# Patient Record
Sex: MALE | Race: WHITE | Employment: OTHER | URBAN - NONMETROPOLITAN AREA
[De-identification: names, ages, dates, MRNs, and addresses within clinical notes are randomized per-mention and may not be internally consistent; named-entity substitution may affect disease eponyms.]

---

## 2017-03-01 ENCOUNTER — PROCEDURE VISIT (OUTPATIENT)
Dept: UROLOGY | Age: 82
End: 2017-03-01

## 2017-03-01 DIAGNOSIS — Z85.46 PERSONAL HISTORY OF PROSTATE CANCER: Primary | ICD-10-CM

## 2017-03-01 PROCEDURE — 99999 PR OFFICE/OUTPT VISIT,PROCEDURE ONLY: CPT | Performed by: UROLOGY

## 2017-03-01 PROCEDURE — 1036F TOBACCO NON-USER: CPT | Performed by: UROLOGY

## 2017-03-01 PROCEDURE — 96402 CHEMO HORMON ANTINEOPL SQ/IM: CPT | Performed by: UROLOGY

## 2017-06-07 ENCOUNTER — OFFICE VISIT (OUTPATIENT)
Dept: UROLOGY | Age: 82
End: 2017-06-07
Payer: MEDICARE

## 2017-06-07 VITALS — HEART RATE: 60 BPM | SYSTOLIC BLOOD PRESSURE: 116 MMHG | DIASTOLIC BLOOD PRESSURE: 84 MMHG | RESPIRATION RATE: 16 BRPM

## 2017-06-07 DIAGNOSIS — C61 PROSTATE CANCER (HCC): Primary | ICD-10-CM

## 2017-06-07 PROCEDURE — 99999 PR OFFICE/OUTPT VISIT,PROCEDURE ONLY: CPT | Performed by: UROLOGY

## 2017-06-07 PROCEDURE — 96402 CHEMO HORMON ANTINEOPL SQ/IM: CPT | Performed by: UROLOGY

## 2017-06-07 PROCEDURE — 1036F TOBACCO NON-USER: CPT | Performed by: UROLOGY

## 2017-09-19 ENCOUNTER — PROCEDURE VISIT (OUTPATIENT)
Dept: UROLOGY | Age: 82
End: 2017-09-19
Payer: MEDICARE

## 2017-09-19 VITALS — HEART RATE: 64 BPM | SYSTOLIC BLOOD PRESSURE: 124 MMHG | RESPIRATION RATE: 16 BRPM | DIASTOLIC BLOOD PRESSURE: 80 MMHG

## 2017-09-19 DIAGNOSIS — C61 PROSTATE CANCER (HCC): Primary | ICD-10-CM

## 2017-09-19 PROCEDURE — 96402 CHEMO HORMON ANTINEOPL SQ/IM: CPT | Performed by: UROLOGY

## 2017-12-20 ENCOUNTER — OFFICE VISIT (OUTPATIENT)
Dept: UROLOGY | Age: 82
End: 2017-12-20
Payer: MEDICARE

## 2017-12-20 ENCOUNTER — HOSPITAL ENCOUNTER (OUTPATIENT)
Dept: LAB | Age: 82
Setting detail: SPECIMEN
Discharge: HOME | End: 2017-12-20
Payer: MEDICARE

## 2017-12-20 VITALS — DIASTOLIC BLOOD PRESSURE: 80 MMHG | SYSTOLIC BLOOD PRESSURE: 130 MMHG | HEART RATE: 62 BPM

## 2017-12-20 DIAGNOSIS — C61 PROSTATE CANCER (HCC): Primary | ICD-10-CM

## 2017-12-20 DIAGNOSIS — C61 PROSTATE CANCER (HCC): ICD-10-CM

## 2017-12-20 LAB
PROSTATE SPECIFIC ANTIGEN: 3.43 UG/L
TESTOSTERONE TOTAL: 10 NG/DL (ref 220–1000)

## 2017-12-20 PROCEDURE — 84403 ASSAY OF TOTAL TESTOSTERONE: CPT

## 2017-12-20 PROCEDURE — 96402 CHEMO HORMON ANTINEOPL SQ/IM: CPT | Performed by: UROLOGY

## 2017-12-20 PROCEDURE — 36415 COLL VENOUS BLD VENIPUNCTURE: CPT

## 2017-12-20 PROCEDURE — 84153 ASSAY OF PSA TOTAL: CPT

## 2017-12-20 PROCEDURE — 99212 OFFICE O/P EST SF 10 MIN: CPT | Performed by: UROLOGY

## 2017-12-20 NOTE — PROGRESS NOTES
Following 's plan of care:    Eligard 22.5 mg was given SQ in the Right lower quad  Lot#:  [id number]  NDC#: 50491-551-14  Expiration Date:  5/2019    After injection was given there were no reactions at injection site and patient was feeling well. Patient was notified that possible side effects from injections include:  Redness, swelling and itching at the injection site. Possible sie effects of androgen deprivation therapy, including hot flashes, flushing of the skin, increased weight, decreased sex drive, and difficulties with ED. Patient was instructed to call the office with any further questions or concerns. Medication was supplied from [facility].
 celecoxib (CELEBREX) 100 MG capsule       oxybutynin (DITROPAN) 5 MG tablet Take 5 mg by mouth 3 times daily      famotidine (PEPCID) 20 MG tablet Take 20 mg by mouth daily      Loratadine 10 MG CAPS Take by mouth      bicalutamide (CASODEX) 50 MG chemo tablet Take 50 mg by mouth daily.  cyanocobalamin 1000 MCG/ML injection Inject 1,000 mcg into the muscle every 30 days.  docusate sodium (COLACE) 100 MG capsule Take 100 mg by mouth daily.  Multiple Vitamins-Minerals (MULTIVITAMIN PO) Take 1 tablet by mouth daily.  polyethylene glycol (GLYCOLAX) packet Take 17 g by mouth daily.  psyllium (METAMUCIL) 0.52 G capsule Take 0.52 g by mouth daily.  tamsulosin (FLOMAX) 0.4 MG capsule Take 0.4 mg by mouth daily.  Cholecalciferol (VITAMIN D3) 2000 UNITS CAPS Take 1 capsule by mouth daily.  acetaminophen (TYLENOL) 325 MG tablet Take 650 mg by mouth every 6 hours as needed.  furosemide (LASIX) 20 MG tablet Take 20 mg by mouth See Admin Instructions. One-half tablet by mouth daily      pravastatin (PRAVACHOL) 20 MG tablet Take 20 mg by mouth daily. Review of patient's allergies indicates no known allergies. History   Smoking Status    Never Smoker   Smokeless Tobacco    Never Used       History   Alcohol Use No       REVIEW OF SYSTEMS:  Constitutional: negative  Eyes: negative  Respiratory: negative  Cardiovascular: negative  Gastrointestinal: negative  Musculoskeletal: negative  Genitourinary: negative except for what is in HPI  Skin: negative   Neurological: negative  Hematological/Lymphatic: negative  Psychological: negative    Physical Exam:      Vitals:    12/20/17 1302   BP: 130/80   Pulse: 62     Patient is a 80 y.o. male in no acute distress and alert and oriented to person, place and time. NAD, A/o  Non labored respiration  Normal peripheral pulses  Skin- warm and dry  Psych- normal mood and affect      Assessment and Plan      1.  Prostate cancer ([facility] [address]

## 2018-03-28 ENCOUNTER — HOSPITAL ENCOUNTER (OUTPATIENT)
Dept: LAB | Age: 83
Setting detail: SPECIMEN
Discharge: HOME | End: 2018-03-28
Payer: MEDICARE

## 2018-03-28 DIAGNOSIS — C61 PROSTATE CANCER (HCC): ICD-10-CM

## 2018-03-28 LAB — PROSTATE SPECIFIC ANTIGEN: 3.92 UG/L

## 2018-03-28 PROCEDURE — 36415 COLL VENOUS BLD VENIPUNCTURE: CPT

## 2018-03-28 PROCEDURE — 84153 ASSAY OF PSA TOTAL: CPT

## 2018-03-30 LAB — DIAGNOSTIC PSA: 3.14 NG/ML

## 2018-06-27 ENCOUNTER — OFFICE VISIT (OUTPATIENT)
Dept: UROLOGY | Age: 83
End: 2018-06-27
Payer: MEDICARE

## 2018-06-27 VITALS — DIASTOLIC BLOOD PRESSURE: 88 MMHG | HEART RATE: 80 BPM | SYSTOLIC BLOOD PRESSURE: 138 MMHG

## 2018-06-27 DIAGNOSIS — R39.9 LOWER URINARY TRACT SYMPTOMS (LUTS): ICD-10-CM

## 2018-06-27 DIAGNOSIS — C61 PROSTATE CANCER (HCC): Primary | ICD-10-CM

## 2018-06-27 DIAGNOSIS — N28.1 RENAL CYST: ICD-10-CM

## 2018-06-27 PROCEDURE — 96402 CHEMO HORMON ANTINEOPL SQ/IM: CPT | Performed by: UROLOGY

## 2018-06-27 PROCEDURE — 1123F ACP DISCUSS/DSCN MKR DOCD: CPT | Performed by: UROLOGY

## 2018-06-27 PROCEDURE — 99214 OFFICE O/P EST MOD 30 MIN: CPT | Performed by: UROLOGY

## 2018-06-27 PROCEDURE — G8421 BMI NOT CALCULATED: HCPCS | Performed by: UROLOGY

## 2018-06-27 PROCEDURE — 4040F PNEUMOC VAC/ADMIN/RCVD: CPT | Performed by: UROLOGY

## 2018-06-27 PROCEDURE — G8427 DOCREV CUR MEDS BY ELIG CLIN: HCPCS | Performed by: UROLOGY

## 2018-06-27 PROCEDURE — 1036F TOBACCO NON-USER: CPT | Performed by: UROLOGY

## 2018-06-27 RX ORDER — BICALUTAMIDE 50 MG/1
50 TABLET, FILM COATED ORAL DAILY
Qty: 90 TABLET | Refills: 3 | Status: SHIPPED | OUTPATIENT
Start: 2018-06-27 | End: 2019-01-01

## 2018-08-01 ENCOUNTER — OFFICE VISIT (OUTPATIENT)
Dept: UROLOGY | Age: 83
End: 2018-08-01
Payer: MEDICARE

## 2018-08-01 VITALS — DIASTOLIC BLOOD PRESSURE: 80 MMHG | HEART RATE: 64 BPM | SYSTOLIC BLOOD PRESSURE: 134 MMHG | OXYGEN SATURATION: 96 %

## 2018-08-01 DIAGNOSIS — R39.9 LOWER URINARY TRACT SYMPTOMS (LUTS): ICD-10-CM

## 2018-08-01 DIAGNOSIS — N28.1 RENAL CYST: Primary | ICD-10-CM

## 2018-08-01 DIAGNOSIS — C61 PROSTATE CANCER (HCC): ICD-10-CM

## 2018-08-01 PROCEDURE — G8421 BMI NOT CALCULATED: HCPCS | Performed by: UROLOGY

## 2018-08-01 PROCEDURE — 1036F TOBACCO NON-USER: CPT | Performed by: UROLOGY

## 2018-08-01 PROCEDURE — 4040F PNEUMOC VAC/ADMIN/RCVD: CPT | Performed by: UROLOGY

## 2018-08-01 PROCEDURE — 1101F PT FALLS ASSESS-DOCD LE1/YR: CPT | Performed by: UROLOGY

## 2018-08-01 PROCEDURE — G8427 DOCREV CUR MEDS BY ELIG CLIN: HCPCS | Performed by: UROLOGY

## 2018-08-01 PROCEDURE — 1123F ACP DISCUSS/DSCN MKR DOCD: CPT | Performed by: UROLOGY

## 2018-08-01 PROCEDURE — 99214 OFFICE O/P EST MOD 30 MIN: CPT | Performed by: UROLOGY

## 2018-08-01 NOTE — PROGRESS NOTES
Augusto Bentley MD   Urology Clinic Progress Note      Patient:  Flakita Sinha  YOB: 1933  Date: 8/1/2018    HISTORY OF PRESENT ILLNESS:   The patient is a 80 y.o. male who presents today for follow-up for the following problem(s): prostate cancer  Overall the problem(s) : show no change. Associated Symptoms: No dysuria, gross hematuria. Pain Severity: Pain Score:   0 - No pain    Summary of old records: Prostate Cancer:  Patient is here today for prostate cancer which was first diagnosed many year(s) ago. His last several PSA values are as follows:  1.70 on 11/2/16  Last 1.6  Previous treatment for prostate cancer: XRT  Lower urinary tract symptoms: none as patient had a penectomy. PSA 6/4/2018 is 6.00  CT scan and bone scan 4/2018 does not show evidence of metastatic dz   Testosterone is 10    Additional History:     Minimally verbal   Asymptomatic  Eligard shot q 6 months        Last several PSA's:  Lab Results   Component Value Date    PSA 3.92 03/28/2018    PSA 3.43 12/20/2017       Last total testosterone:  Lab Results   Component Value Date    TESTOSTERONE 10 (L) 12/20/2017       Urinalysis today:  No results found for this visit on 08/01/18.     Last BUN and creatinine:  No results found for: BUN  No results found for: CREATININE    Imaging Reviewed during this Office Visit:   (results were independently reviewed by physician and radiology report verified)    PAST MEDICAL, FAMILY AND SOCIAL HISTORY UPDATE:  Past Medical History:   Diagnosis Date    Anemia     BPH (benign prostatic hyperplasia)     Dementia     History of prostate cancer     Hyperlipidemia     Mental retardation     Osteoarthritis      Past Surgical History:   Procedure Laterality Date    OTHER SURGICAL HISTORY  03/05/2013    WIDE DEBRIDEMENT OF NECROTIC TISSUE, DISTAL PENECTOMY, HEMISCROTECTOMY, PARTIAL URETERECTOMY    URETER REMOVAL  03/05/2013    PARTIAL    URETHRAL STRICTURE DILATATION       History reviewed. No pertinent family history. Outpatient Prescriptions Marked as Taking for the 8/1/18 encounter (Office Visit) with Shana Ricks MD   Medication Sig Dispense Refill    Nutritional Supplements (ENSURE PLUS HN PO) Take by mouth 2 times daily      celecoxib (CELEBREX) 100 MG capsule       oxybutynin (DITROPAN) 5 MG tablet Take 5 mg by mouth 3 times daily      famotidine (PEPCID) 20 MG tablet Take 20 mg by mouth daily      Loratadine 10 MG CAPS Take by mouth      cyanocobalamin 1000 MCG/ML injection Inject 1,000 mcg into the muscle every 30 days.  docusate sodium (COLACE) 100 MG capsule Take 100 mg by mouth daily.  Multiple Vitamins-Minerals (MULTIVITAMIN PO) Take 1 tablet by mouth daily.  polyethylene glycol (GLYCOLAX) packet Take 17 g by mouth daily.  psyllium (METAMUCIL) 0.52 G capsule Take 0.52 g by mouth daily.  tamsulosin (FLOMAX) 0.4 MG capsule Take 0.4 mg by mouth daily.  Cholecalciferol (VITAMIN D3) 2000 UNITS CAPS Take 1 capsule by mouth daily.  acetaminophen (TYLENOL) 325 MG tablet Take 650 mg by mouth every 6 hours as needed.  furosemide (LASIX) 20 MG tablet Take 20 mg by mouth See Admin Instructions. One-half tablet by mouth daily      pravastatin (PRAVACHOL) 20 MG tablet Take 20 mg by mouth daily. Patient has no known allergies.   History   Smoking Status    Never Smoker   Smokeless Tobacco    Never Used       History   Alcohol Use No       REVIEW OF SYSTEMS:  Constitutional: negative  Eyes: negative  Respiratory: negative  Cardiovascular: negative  Gastrointestinal: negative  Musculoskeletal: negative  Genitourinary: negative except for what is in HPI  Skin: negative   Neurological: negative  Hematological/Lymphatic: negative  Psychological: negative    Physical Exam:      Vitals:    08/01/18 1404   BP: 134/80   Pulse: 64   SpO2: 96%     Patient is a 80 y.o. male in no acute distress and alert and oriented to person, place and

## 2018-09-20 LAB — DIAGNOSTIC PSA: 7.17 NG/ML

## 2018-10-03 ENCOUNTER — PROCEDURE VISIT (OUTPATIENT)
Dept: UROLOGY | Age: 83
End: 2018-10-03
Payer: MEDICARE

## 2018-10-03 ENCOUNTER — HOSPITAL ENCOUNTER (OUTPATIENT)
Dept: LAB | Age: 83
Setting detail: SPECIMEN
Discharge: HOME | End: 2018-10-03
Payer: MEDICARE

## 2018-10-03 VITALS — DIASTOLIC BLOOD PRESSURE: 86 MMHG | SYSTOLIC BLOOD PRESSURE: 130 MMHG | HEART RATE: 96 BPM

## 2018-10-03 DIAGNOSIS — C61 PROSTATE CANCER (HCC): Primary | ICD-10-CM

## 2018-10-03 DIAGNOSIS — C61 PROSTATE CANCER (HCC): ICD-10-CM

## 2018-10-03 LAB
CREAT SERPL-MCNC: 0.75 MG/DL (ref 0.7–1.2)
GFR AFRICAN AMERICAN: >60 ML/MIN
GFR NON-AFRICAN AMERICAN: >60 ML/MIN
GFR SERPL CREATININE-BSD FRML MDRD: NORMAL ML/MIN/{1.73_M2}
GFR SERPL CREATININE-BSD FRML MDRD: NORMAL ML/MIN/{1.73_M2}

## 2018-10-03 PROCEDURE — 1036F TOBACCO NON-USER: CPT | Performed by: UROLOGY

## 2018-10-03 PROCEDURE — 4040F PNEUMOC VAC/ADMIN/RCVD: CPT | Performed by: UROLOGY

## 2018-10-03 PROCEDURE — G8428 CUR MEDS NOT DOCUMENT: HCPCS | Performed by: UROLOGY

## 2018-10-03 PROCEDURE — 1101F PT FALLS ASSESS-DOCD LE1/YR: CPT | Performed by: UROLOGY

## 2018-10-03 PROCEDURE — 96402 CHEMO HORMON ANTINEOPL SQ/IM: CPT | Performed by: UROLOGY

## 2018-10-03 PROCEDURE — 36415 COLL VENOUS BLD VENIPUNCTURE: CPT

## 2018-10-03 PROCEDURE — G8484 FLU IMMUNIZE NO ADMIN: HCPCS | Performed by: UROLOGY

## 2018-10-03 PROCEDURE — 1123F ACP DISCUSS/DSCN MKR DOCD: CPT | Performed by: UROLOGY

## 2018-10-03 PROCEDURE — 82565 ASSAY OF CREATININE: CPT

## 2018-10-03 PROCEDURE — 99213 OFFICE O/P EST LOW 20 MIN: CPT | Performed by: UROLOGY

## 2018-10-03 PROCEDURE — G8421 BMI NOT CALCULATED: HCPCS | Performed by: UROLOGY

## 2018-10-15 ENCOUNTER — HOSPITAL ENCOUNTER (OUTPATIENT)
Dept: CT IMAGING | Age: 83
Discharge: HOME | End: 2018-10-17
Payer: MEDICARE

## 2018-10-15 ENCOUNTER — HOSPITAL ENCOUNTER (OUTPATIENT)
Dept: NUCLEAR MEDICINE | Age: 83
End: 2018-10-15
Payer: MEDICARE

## 2018-10-15 ENCOUNTER — HOSPITAL ENCOUNTER (OUTPATIENT)
Dept: NUCLEAR MEDICINE | Age: 83
Discharge: HOME | End: 2018-10-17
Payer: MEDICARE

## 2018-10-15 DIAGNOSIS — C61 PROSTATE CANCER (HCC): ICD-10-CM

## 2018-10-15 PROCEDURE — 3430000000 HC RX DIAGNOSTIC RADIOPHARMACEUTICAL: Performed by: UROLOGY

## 2018-10-15 PROCEDURE — 78306 BONE IMAGING WHOLE BODY: CPT

## 2018-10-15 PROCEDURE — A9503 TC99M MEDRONATE: HCPCS | Performed by: UROLOGY

## 2018-10-15 PROCEDURE — 6360000004 HC RX CONTRAST MEDICATION: Performed by: UROLOGY

## 2018-10-15 PROCEDURE — 2709999900 CT ABDOMEN PELVIS W IV CONTRAST

## 2018-10-15 RX ORDER — TC 99M MEDRONATE 20 MG/10ML
25 INJECTION, POWDER, LYOPHILIZED, FOR SOLUTION INTRAVENOUS
Status: COMPLETED | OUTPATIENT
Start: 2018-10-15 | End: 2018-10-15

## 2018-10-15 RX ADMIN — Medication 25 MILLICURIE: at 12:30

## 2018-10-15 RX ADMIN — IOPAMIDOL 100 ML: 755 INJECTION, SOLUTION INTRAVENOUS at 14:08

## 2018-10-24 ENCOUNTER — OFFICE VISIT (OUTPATIENT)
Dept: UROLOGY | Age: 83
End: 2018-10-24
Payer: MEDICARE

## 2018-10-24 VITALS — DIASTOLIC BLOOD PRESSURE: 82 MMHG | HEART RATE: 56 BPM | OXYGEN SATURATION: 97 % | SYSTOLIC BLOOD PRESSURE: 122 MMHG

## 2018-10-24 DIAGNOSIS — C61 PROSTATE CANCER (HCC): Primary | ICD-10-CM

## 2018-10-24 DIAGNOSIS — R39.9 LOWER URINARY TRACT SYMPTOMS (LUTS): ICD-10-CM

## 2018-10-24 DIAGNOSIS — N28.1 RENAL CYST: ICD-10-CM

## 2018-10-24 PROCEDURE — 1101F PT FALLS ASSESS-DOCD LE1/YR: CPT | Performed by: UROLOGY

## 2018-10-24 PROCEDURE — G8427 DOCREV CUR MEDS BY ELIG CLIN: HCPCS | Performed by: UROLOGY

## 2018-10-24 PROCEDURE — 4040F PNEUMOC VAC/ADMIN/RCVD: CPT | Performed by: UROLOGY

## 2018-10-24 PROCEDURE — 1123F ACP DISCUSS/DSCN MKR DOCD: CPT | Performed by: UROLOGY

## 2018-10-24 PROCEDURE — 99214 OFFICE O/P EST MOD 30 MIN: CPT | Performed by: UROLOGY

## 2018-10-24 PROCEDURE — G8421 BMI NOT CALCULATED: HCPCS | Performed by: UROLOGY

## 2018-10-24 PROCEDURE — 1036F TOBACCO NON-USER: CPT | Performed by: UROLOGY

## 2018-10-24 PROCEDURE — G8484 FLU IMMUNIZE NO ADMIN: HCPCS | Performed by: UROLOGY

## 2018-10-24 NOTE — PROGRESS NOTES
There cystic changes involving the right kidney, largest measuring 2.6 cm in greatest axial dimension. Smaller cyst is noted involving the left kidney measuring 1.5 cm. No hydronephrosis. The abdominal aorta demonstrates mild calcification without aneurysm. GI/Bowel: Small hiatal hernia. Distal stomach appears grossly unremarkable. Small bowel appears nondilated. The appendix is not clearly identified. No acute colonic abnormality is seen. Pelvis: Urinary bladder is decompressed by a suprapubic catheter. Prostate gland appears normal in size. Peritoneum/Retroperitoneum: No free air, free fluid or lymphadenopathy. Bones/Soft Tissues: Abdominal wall demonstrates uncomplicated fat filled umbilical hernia. No abdominal wall fluid collection is noted. Bilateral gynecomastia is seen. Osseous structures demonstrate degenerative change. No aggressive bony lesions are identified, however this will be completely evaluated by bone scan. 1. No definitive CT evidence of progression of disease. 2. Small hiatal hernia. Nm Bone Scan Whole Body    Result Date: 10/16/2018  EXAMINATION: WHOLE BODY BONE SCAN  10/15/2018 TECHNIQUE: The patient was injected intravenously with 25.9 mCi of 99 mTc MDP and scintigraphy of the entire skeleton was performed approximately three hours later. COMPARISON: CT abdomen and pelvis performed earlier today. HISTORY: ORDERING SYSTEM PROVIDED HISTORY: Prostate cancer [facility]) TECHNOLOGIST PROVIDED HISTORY: prostate cancer FINDINGS: Examination is suboptimal due to patient condition as the patient's upper extremities are seen overlying the chest and abdomen. No abnormal foci of radiotracer uptake to suggest metastatic disease. Foci of activity in the shoulders and knees are most likely degenerative. In addition, there there is regions are seen extending from the patient's suprapubic catheter is well as the spilling into the patient's groin region possibly from his penis.  The remainder of the osseous structures and soft tissues are unremarkable. Physiologic activity is present in the skeletal and renal collecting systems. Suboptimal evaluation due to patient condition. Given that limitation, no scintigraphic evidence to suggest osseous metastatic disease. Minimally verbal   Asymptomatic  Eligard shot q 6 months    Last several PSA's:  Lab Results   Component Value Date    PSA 3.92 03/28/2018    PSA 3.43 12/20/2017       Last total testosterone:  Lab Results   Component Value Date    TESTOSTERONE 10 (L) 12/20/2017       Urinalysis today:  No results found for this visit on 10/24/18. Last BUN and creatinine:  No results found for: BUN  Lab Results   Component Value Date    CREATININE 0.75 10/03/2018       Imaging Reviewed during this Office Visit:   (results were independently reviewed by physician and radiology report verified)    PAST MEDICAL, FAMILY AND SOCIAL HISTORY UPDATE:  Past Medical History:   Diagnosis Date    Anemia     BPH (benign prostatic hyperplasia)     Dementia     History of prostate cancer     Hyperlipidemia     Mental retardation     Osteoarthritis      Past Surgical History:   Procedure Laterality Date    OTHER SURGICAL HISTORY  03/05/2013    WIDE DEBRIDEMENT OF NECROTIC TISSUE, DISTAL PENECTOMY, HEMISCROTECTOMY, PARTIAL URETERECTOMY    URETER REMOVAL  03/05/2013    PARTIAL    URETHRAL STRICTURE DILATATION       History reviewed. No pertinent family history. No outpatient prescriptions have been marked as taking for the 10/24/18 encounter (Office Visit) with Shanthi Galvin MD.       Patient has no known allergies.   History   Smoking Status    Never Smoker   Smokeless Tobacco    Never Used       History   Alcohol Use No       REVIEW OF SYSTEMS:  Constitutional: negative  Eyes: negative  Respiratory: negative  Cardiovascular: negative  Gastrointestinal: negative  Musculoskeletal: negative  Genitourinary: negative except for what is in HPI  Skin: negative

## 2019-01-01 ENCOUNTER — OFFICE VISIT (OUTPATIENT)
Dept: UROLOGY | Age: 84
End: 2019-01-01
Payer: MEDICARE

## 2019-01-01 ENCOUNTER — PROCEDURE VISIT (OUTPATIENT)
Dept: UROLOGY | Age: 84
End: 2019-01-01
Payer: MEDICARE

## 2019-01-01 ENCOUNTER — HOSPITAL ENCOUNTER (OUTPATIENT)
Dept: NUCLEAR MEDICINE | Age: 84
Discharge: HOME | End: 2019-06-07
Payer: MEDICARE

## 2019-01-01 ENCOUNTER — HOSPITAL ENCOUNTER (OUTPATIENT)
Dept: NUCLEAR MEDICINE | Age: 84
Discharge: HOME | End: 2019-12-08
Payer: MEDICARE

## 2019-01-01 ENCOUNTER — HOSPITAL ENCOUNTER (OUTPATIENT)
Dept: CT IMAGING | Age: 84
Discharge: HOME | End: 2019-06-07
Payer: MEDICARE

## 2019-01-01 ENCOUNTER — HOSPITAL ENCOUNTER (OUTPATIENT)
Dept: CT IMAGING | Age: 84
Discharge: HOME | End: 2019-12-08
Payer: MEDICARE

## 2019-01-01 ENCOUNTER — OFFICE VISIT (OUTPATIENT)
Dept: ONCOLOGY | Age: 84
End: 2019-01-01
Payer: MEDICARE

## 2019-01-01 VITALS
SYSTOLIC BLOOD PRESSURE: 136 MMHG | TEMPERATURE: 97.5 F | RESPIRATION RATE: 16 BRPM | DIASTOLIC BLOOD PRESSURE: 80 MMHG | HEART RATE: 64 BPM

## 2019-01-01 VITALS — SYSTOLIC BLOOD PRESSURE: 120 MMHG | DIASTOLIC BLOOD PRESSURE: 70 MMHG | HEART RATE: 80 BPM

## 2019-01-01 VITALS
DIASTOLIC BLOOD PRESSURE: 74 MMHG | RESPIRATION RATE: 16 BRPM | HEART RATE: 60 BPM | SYSTOLIC BLOOD PRESSURE: 124 MMHG | OXYGEN SATURATION: 95 %

## 2019-01-01 VITALS — OXYGEN SATURATION: 97 % | DIASTOLIC BLOOD PRESSURE: 82 MMHG | SYSTOLIC BLOOD PRESSURE: 124 MMHG | HEART RATE: 58 BPM

## 2019-01-01 DIAGNOSIS — R54 FRAIL ELDERLY: ICD-10-CM

## 2019-01-01 DIAGNOSIS — C61 PROSTATE CANCER (HCC): Primary | ICD-10-CM

## 2019-01-01 DIAGNOSIS — C61 PROSTATE CANCER (HCC): ICD-10-CM

## 2019-01-01 DIAGNOSIS — N28.1 RENAL CYST: ICD-10-CM

## 2019-01-01 DIAGNOSIS — R39.9 LOWER URINARY TRACT SYMPTOMS (LUTS): ICD-10-CM

## 2019-01-01 DIAGNOSIS — R97.20 RISING PSA LEVEL: ICD-10-CM

## 2019-01-01 DIAGNOSIS — F03.90 DEMENTIA WITHOUT BEHAVIORAL DISTURBANCE, UNSPECIFIED DEMENTIA TYPE: ICD-10-CM

## 2019-01-01 LAB
AGE FOR GFR: 85
CREAT SERPL-MCNC: 0.6 MG/DL (ref 0.7–1.3)
DIAGNOSTIC PSA: 22.6 NG/ML (ref 0–4)
DIAGNOSTIC PSA: 23.4 NG/ML (ref 0–4)
EGFR BF: 115 ML/MIN/1.73 M2
EGFR BM: 155 ML/MIN/1.73 M2
EGFR WF: 95 ML/MIN/1.73 M2
EGFR WM: 128 ML/MIN/1.73 M2
PROSTATE SPECIFIC ANTIGEN: 46.3 NG/ML

## 2019-01-01 PROCEDURE — 99213 OFFICE O/P EST LOW 20 MIN: CPT | Performed by: UROLOGY

## 2019-01-01 PROCEDURE — 1036F TOBACCO NON-USER: CPT | Performed by: UROLOGY

## 2019-01-01 PROCEDURE — 6360000004 HC RX CONTRAST MEDICATION: Performed by: UROLOGY

## 2019-01-01 PROCEDURE — 96402 CHEMO HORMON ANTINEOPL SQ/IM: CPT | Performed by: UROLOGY

## 2019-01-01 PROCEDURE — A9503 TC99M MEDRONATE: HCPCS | Performed by: UROLOGY

## 2019-01-01 PROCEDURE — 3430000000 HC RX DIAGNOSTIC RADIOPHARMACEUTICAL: Performed by: UROLOGY

## 2019-01-01 PROCEDURE — 1123F ACP DISCUSS/DSCN MKR DOCD: CPT | Performed by: UROLOGY

## 2019-01-01 PROCEDURE — G8427 DOCREV CUR MEDS BY ELIG CLIN: HCPCS | Performed by: UROLOGY

## 2019-01-01 PROCEDURE — G8427 DOCREV CUR MEDS BY ELIG CLIN: HCPCS | Performed by: INTERNAL MEDICINE

## 2019-01-01 PROCEDURE — G8421 BMI NOT CALCULATED: HCPCS | Performed by: INTERNAL MEDICINE

## 2019-01-01 PROCEDURE — 78306 BONE IMAGING WHOLE BODY: CPT

## 2019-01-01 PROCEDURE — 4040F PNEUMOC VAC/ADMIN/RCVD: CPT | Performed by: UROLOGY

## 2019-01-01 PROCEDURE — 99214 OFFICE O/P EST MOD 30 MIN: CPT | Performed by: UROLOGY

## 2019-01-01 PROCEDURE — G8484 FLU IMMUNIZE NO ADMIN: HCPCS | Performed by: INTERNAL MEDICINE

## 2019-01-01 PROCEDURE — G8421 BMI NOT CALCULATED: HCPCS | Performed by: UROLOGY

## 2019-01-01 PROCEDURE — 74176 CT ABD & PELVIS W/O CONTRAST: CPT

## 2019-01-01 PROCEDURE — 99204 OFFICE O/P NEW MOD 45 MIN: CPT | Performed by: INTERNAL MEDICINE

## 2019-01-01 PROCEDURE — 74177 CT ABD & PELVIS W/CONTRAST: CPT

## 2019-01-01 RX ORDER — IPRATROPIUM BROMIDE AND ALBUTEROL SULFATE 2.5; .5 MG/3ML; MG/3ML
1 SOLUTION RESPIRATORY (INHALATION) EVERY 4 HOURS
COMMUNITY

## 2019-01-01 RX ORDER — DICLOFENAC POTASSIUM 50 MG/1
50 TABLET, FILM COATED ORAL 3 TIMES DAILY
COMMUNITY

## 2019-01-01 RX ORDER — FERROUS SULFATE 325(65) MG
325 TABLET ORAL
COMMUNITY

## 2019-01-01 RX ORDER — TC 99M MEDRONATE 20 MG/10ML
25 INJECTION, POWDER, LYOPHILIZED, FOR SOLUTION INTRAVENOUS
Status: COMPLETED | OUTPATIENT
Start: 2019-01-01 | End: 2019-01-01

## 2019-01-01 RX ORDER — BICALUTAMIDE 50 MG/1
50 TABLET, FILM COATED ORAL DAILY
Qty: 30 TABLET | Refills: 3 | Status: SHIPPED | OUTPATIENT
Start: 2019-01-01

## 2019-01-01 RX ORDER — ESCITALOPRAM OXALATE 5 MG/1
5 TABLET ORAL DAILY
COMMUNITY

## 2019-01-01 RX ORDER — ACETYLCYSTEINE 200 MG/ML
70 SOLUTION ORAL; RESPIRATORY (INHALATION) EVERY 4 HOURS
COMMUNITY

## 2019-01-01 RX ADMIN — Medication 25 MILLICURIE: at 12:33

## 2019-01-01 RX ADMIN — IOPAMIDOL 100 ML: 755 INJECTION, SOLUTION INTRAVENOUS at 12:37

## 2019-01-01 RX ADMIN — Medication 25 MILLICURIE: at 09:19

## 2019-05-15 NOTE — PROGRESS NOTES
Eligaurd 45mg given SC in LLQ, as ordered, patient tolerated well. NDC#: 88509-797-89  Lot: [id number]  Exp date: 2/28/20    After injection was given there were no reactions at injection site and patient was feeling well . Patient notified that possible side effects from injections include: redness, swelling, and itching at the injection site. Possible side effects of androgen deprivation therapy including hot flashes, flushing of skin, increased weight, decreased sex drive, difficulties with ED. Patient was instructed to call office with any further questions or concerns. Medication was supplied from [facility].

## 2019-05-15 NOTE — PROGRESS NOTES
Echo Price MD   Urology Clinic Progress Note      Patient:  Gentry Montoya  YOB: 1933  Date: 5/15/2019    HISTORY OF PRESENT ILLNESS:   The patient is a 80 y.o. male who presents today for follow-up for the following problem(s): prostate cancer  Overall the problem(s) : worsening  Associated Symptoms: No dysuria, gross hematuria. Pain Severity:      Summary of old records: Prostate Cancer:  Patient is here today for prostate cancer which was first diagnosed many year(s) ago. His last several PSA values are as follows:  1.70 on 11/2/16  Last 1.6  Previous treatment for prostate cancer: XRT  Lower urinary tract symptoms: none as patient had a penectomy. PSA 6/4/2018 is 6.00  CT scan and bone scan 4/2018 does not show evidence of metastatic dz   Testosterone is 10    Additional History:     PSA from 7 to 23.4  Minimally verbal   Asymptomatic  Eligard shot q 6 months    I independently reviewed and verified the images and reports from:  Ct Abdomen Pelvis W Iv Contrast Additional Contrast? None  Result Date: 10/15/2018  1. No definitive CT evidence of progression of disease. 2. Small hiatal hernia. Nm Bone Scan Whole Body  Result Date: 10/16/2018  Suboptimal evaluation due to patient condition. Given that limitation, no scintigraphic evidence to suggest osseous metastatic disease. Last several PSA's:  Lab Results   Component Value Date    PSA 3.92 03/28/2018    PSA 3.43 12/20/2017       Last total testosterone:  Lab Results   Component Value Date    TESTOSTERONE 10 (L) 12/20/2017       Urinalysis today:  No results found for this visit on 05/15/19.     Last BUN and creatinine:  No results found for: BUN  Lab Results   Component Value Date    CREATININE 0.75 10/03/2018       Imaging Reviewed during this Office Visit:   (results were independently reviewed by physician and radiology report verified)    [address]:  Past Medical History: Diagnosis Date    Anemia     BPH (benign prostatic hyperplasia)     Dementia     History of prostate cancer     Hyperlipidemia     Mental retardation     Osteoarthritis      Past Surgical History:   Procedure Laterality Date    OTHER SURGICAL HISTORY  03/05/2013    WIDE DEBRIDEMENT OF NECROTIC TISSUE, DISTAL PENECTOMY, HEMISCROTECTOMY, PARTIAL URETERECTOMY    URETER REMOVAL  03/05/2013    PARTIAL    URETHRAL STRICTURE DILATATION       No family history on file. Outpatient Medications Marked as Taking for the 5/15/19 encounter (Procedure visit) with Tyler Henderson MD   Medication Sig Dispense Refill    Nutritional Supplements (ENSURE PLUS HN PO) Take by mouth 2 times daily      celecoxib (CELEBREX) 100 MG capsule       oxybutynin (DITROPAN) 5 MG tablet Take 5 mg by mouth 3 times daily      famotidine (PEPCID) 20 MG tablet Take 20 mg by mouth daily      Loratadine 10 MG CAPS Take by mouth      cyanocobalamin 1000 MCG/ML injection Inject 1,000 mcg into the muscle every 30 days.  docusate sodium (COLACE) 100 MG capsule Take 100 mg by mouth daily.  Multiple Vitamins-Minerals (MULTIVITAMIN PO) Take 1 tablet by mouth daily.  polyethylene glycol (GLYCOLAX) packet Take 17 g by mouth daily.  psyllium (METAMUCIL) 0.52 G capsule Take 0.52 g by mouth daily.  tamsulosin (FLOMAX) 0.4 MG capsule Take 0.4 mg by mouth daily.  Cholecalciferol (VITAMIN D3) 2000 UNITS CAPS Take 1 capsule by mouth daily.  acetaminophen (TYLENOL) 325 MG tablet Take 650 mg by mouth every 6 hours as needed.  furosemide (LASIX) 20 MG tablet Take 20 mg by mouth See Admin Instructions. One-half tablet by mouth daily      pravastatin (PRAVACHOL) 20 MG tablet Take 20 mg by mouth daily. Patient has no known allergies.   Social History     Tobacco Use   Smoking Status Never Smoker   Smokeless Tobacco Never Used       Social History     Substance and Sexual Activity   Alcohol Use No       REVIEW OF SYSTEMS:  Constitutional: negative  Eyes: negative  Respiratory: negative  Cardiovascular: negative  Gastrointestinal: negative  Musculoskeletal: negative  Genitourinary: negative except for what is in HPI  Skin: negative   Neurological: negative  Hematological/Lymphatic: negative  Psychological: negative    Physical Exam:      Vitals:    05/15/19 1015   BP: 124/82   Pulse: 58   SpO2: 97%     Patient is a 80 y.o. male in no acute distress and alert and oriented to person, place and time. NAD, A/o  Non labored respiration  Normal peripheral pulses  Skin- warm and dry  Psych- normal mood and affect      Assessment and Plan      1. Prostate cancer ([address]    2. Renal cyst    3. Lower urinary tract symptoms (LUTS)           Plan:       Patient is AUA index 1 patient with biochemical recurrence; patient is castrated  Imaging in 4/2018 was negative for mets;  rising PSA despite being on ADT  Repeat imaging shows no progression of cancer 10/2018  PSA is rising- repeat CT and bone scan, follow up to review.   May need Onc referral  Continue androgen deprivation treatment- q6months  LUTS- suprapubic in place    FU 6 months For PSA and Eligard shot Almeta Mcburney, M.D [facility]

## 2019-06-07 NOTE — PROGRESS NOTES
Sheryl Sales MD   Urology Clinic Progress Note      Patient:  Niels Holter  YOB: 1933  Date: 6/7/2019    HISTORY OF PRESENT ILLNESS:   The patient is a 80 y.o. male who presents today for follow-up for the following problem(s): prostate cancer  Overall the problem(s) : worsening  Associated Symptoms: No dysuria, gross hematuria. Pain Severity:      Summary of old records: Prostate Cancer:  Patient is here today for prostate cancer which was first diagnosed many year(s) ago. His last several PSA values are as follows:  1.70 on 11/2/16  Last 1.6  Previous treatment for prostate cancer: XRT  Lower urinary tract symptoms: none as patient had a penectomy. PSA 6/4/2018 is 6.00  CT scan and bone scan 4/2018 and 10/2018 does not show evidence of metastatic dz   Testosterone is 10    Additional History:     PSA from 7 to 23.4 (5/2019)  Here to review images  No issues, complaints    I independently reviewed and verified the images and reports from:    Ct Abdomen Pelvis W Iv Contrast Additional Contrast? None    Result Date: 6/5/2019  EXAMINATION: CT OF THE ABDOMEN AND PELVIS WITH CONTRAST 6/5/2019 12:21 pm TECHNIQUE: CT of the abdomen and pelvis was performed with the administration of intravenous contrast. Multiplanar reformatted images are provided for review. Dose modulation, iterative reconstruction, and/or weight based adjustment of the mA/kV was utilized to reduce the radiation dose to as low as reasonably achievable. COMPARISON: October 15, 2018 HISTORY: ORDERING SYSTEM PROVIDED HISTORY: Prostate cancer [facility]) TECHNOLOGIST PROVIDED HISTORY: prostate cancer Ordering Physician Provided Reason for Exam: prostate CA check up, patient mentally challenged could not verbalize history,  POA signed consent to treat and is aware of injection FINDINGS: Lower Chest: Trace bilateral pleural fluid is noted. Mild cardiomegaly is noted. Coronary artery calcifications are noted.   Mild atelectasis is seen. Organs: Liver, spleen, pancreas, and adrenals are unremarkable. Kidneys excrete contrast bilaterally. Multiple bilateral renal cysts are present, largest on the right of 2 cm and on the left of 1.8 cm. No hydronephrosis is evident. GI/Bowel: Small hiatal hernia is evident. No free fluid, free air or bowel obstruction is noted. Increased stool burden is noted with constipated appearance. Mild rectosigmoid wall thickening is noted suggestive of coloproctitis. A fat-containing umbilical hernia of 5 cm is noted without strangulation. Pelvis: No appendicitis. No abnormal fluid collections, pelvic or inguinal adenopathy is noted. A suprapubic urinary catheter is noted, balloon within a decompressed urinary bladder. Left inguinal ring is dilated and fat containing without strangulation. Status post prostatectomy. Peritoneum/Retroperitoneum: Aorta is calcified without evidence of aneurysm. No retroperitoneal mass, retroperitoneal or mesenteric adenopathy is seen. Bones/Soft Tissues: No acute osseous abnormality. Findings of gynecomastia are present. There is a subcutaneous nodule in the left lower abdomen of 8 mm. There is suggested connection to the skin surface. This may represent a small sebaceous cyst.  An injection site would also have a similar appearance if history is appropriate. FLUOROSCOPY DOSE AND TYPE OR TIME AND EXPOSURES: Estimated biologic radiation dose for this procedure:1254.19 mGy/cm2.     1. Cardiomegaly, trace pleural fluid and coronary artery disease. 2. Hiatal hernia, markedly increased colonic stool with constipated appearance, and rectosigmoid wall thickening, which may be related to coloproctitis. 3. No bowel obstruction. Post prostatectomy. No advancement of disease. 4. Suprapubic catheter in situ. 5. Ventral hernia, fat-containing without strangulation. 6. Findings of gynecomastia and bilateral renal cysts. Other findings as above.      Nm Bone Scan Whole Body  Result Date: mouth daily.  psyllium (METAMUCIL) 0.52 G capsule Take 0.52 g by mouth daily.  tamsulosin (FLOMAX) 0.4 MG capsule Take 0.4 mg by mouth daily.  Cholecalciferol (VITAMIN D3) 2000 UNITS CAPS Take 1 capsule by mouth daily.  acetaminophen (TYLENOL) 325 MG tablet Take 650 mg by mouth every 6 hours as needed.  furosemide (LASIX) 20 MG tablet Take 20 mg by mouth See Admin Instructions. One-half tablet by mouth daily      pravastatin (PRAVACHOL) 20 MG tablet Take 20 mg by mouth daily. Patient has no known allergies. Social History     Tobacco Use   Smoking Status Never Smoker   Smokeless Tobacco Never Used       Social History     Substance and Sexual Activity   Alcohol Use No       REVIEW OF SYSTEMS:  Constitutional: negative  Eyes: negative  Respiratory: negative  Cardiovascular: negative  Gastrointestinal: negative  Musculoskeletal: negative  Genitourinary: negative except for what is in HPI  Skin: negative   Neurological: negative  Hematological/Lymphatic: negative  Psychological: negative    Physical Exam:      Vitals:    06/07/19 1030   BP: 120/70   Pulse: 80     Patient is a 80 y.o. male in no acute distress and alert and oriented to person, place and time. NAD, A/o  Non labored respiration  Normal peripheral pulses  Skin- warm and dry  Psych- normal mood and affect      Assessment and Plan      1. Prostate cancer ([address]    2. Renal cyst    3.  Lower urinary tract symptoms (LUTS)           Plan:       Patient is AUA index 1 patient with biochemical recurrence; patient is castrated  Imaging in 4/2018 and 10/2018 was negative for mets;  rising PSA despite being on ADT (last injection 5/15/19)    Repeat imaging shows no progression of cancer 6/5/19  Continue androgen deprivation treatment- q6months  LUTS- suprapubic in place  Check PSA 6 months               Almeta Mcburney, M.D [facility]

## 2019-12-18 DIAGNOSIS — C61 PROSTATE CANCER (HCC): ICD-10-CM
